# Patient Record
(demographics unavailable — no encounter records)

---

## 2025-01-30 NOTE — REASON FOR VISIT
[Procedure: _________] : a [unfilled] procedure visit [FreeTextEntry1] : Patient returns for treatment of facial rhytids with Botox

## 2025-01-30 NOTE — ASSESSMENT
[FreeTextEntry1] : A: Facial rhytids P: Botox to glabella, Crow's feet and jose francisco oral rhytids. Tolerated well, instructions reviewed.

## 2025-01-30 NOTE — PROCEDURE
[FreeTextEntry1] : Facial rhytids [FreeTextEntry2] : Botox to glabella, Crow's feet and jose francisco oral rhytids. [FreeTextEntry3] : Topical Quadricaine [FreeTextEntry4] : minimal  [FreeTextEntry5] : none  [FreeTextEntry6] : Following sterile prep, Botox 20 units injected in the glabella, another 20 units in the Crow's feet lines, and 10 units in the jose francisco oral area. Patient tolerated well.   [FreeTextEntry7] : none

## 2025-06-26 NOTE — REASON FOR VISIT
[Procedure: _________] : a [unfilled] procedure visit [FreeTextEntry1] : Patient returns for treatment of facial rhytids with Botox injection

## 2025-06-26 NOTE — PROCEDURE
[FreeTextEntry1] : Facial rhytids  [FreeTextEntry2] : Botox 20 units to glabella, to Crow's feet, and 10 units to jose francisco oral.  [FreeTextEntry3] : None  [FreeTextEntry4] : minimal  [FreeTextEntry5] : none  [FreeTextEntry6] : Patient treated with Botox 20 units to glabella, to Crow's feet, and 10 units to jose francisco oral. following sterile prep.  Tolerated well.

## 2025-06-26 NOTE — ASSESSMENT
[FreeTextEntry1] : A: Facial rhytids  P: Botox 20 units to glabella, to Crow's feet, and 10 units to jose francisco oral, 10 to platysmal bands.   Tolerated well, instructions reviewed.